# Patient Record
Sex: FEMALE
[De-identification: names, ages, dates, MRNs, and addresses within clinical notes are randomized per-mention and may not be internally consistent; named-entity substitution may affect disease eponyms.]

---

## 2021-09-03 ENCOUNTER — NON-APPOINTMENT (OUTPATIENT)
Age: 41
End: 2021-09-03

## 2021-09-07 PROBLEM — Z00.00 ENCOUNTER FOR PREVENTIVE HEALTH EXAMINATION: Status: ACTIVE | Noted: 2021-09-07

## 2021-09-13 ENCOUNTER — APPOINTMENT (OUTPATIENT)
Dept: COLORECTAL SURGERY | Facility: CLINIC | Age: 41
End: 2021-09-13
Payer: COMMERCIAL

## 2021-09-13 ENCOUNTER — TRANSCRIPTION ENCOUNTER (OUTPATIENT)
Age: 41
End: 2021-09-13

## 2021-09-13 VITALS
BODY MASS INDEX: 19.14 KG/M2 | HEART RATE: 64 BPM | SYSTOLIC BLOOD PRESSURE: 126 MMHG | WEIGHT: 104 LBS | TEMPERATURE: 98.3 F | HEIGHT: 62 IN | DIASTOLIC BLOOD PRESSURE: 81 MMHG

## 2021-09-13 PROCEDURE — 46600 DIAGNOSTIC ANOSCOPY SPX: CPT

## 2021-09-13 PROCEDURE — 99203 OFFICE O/P NEW LOW 30 MIN: CPT | Mod: 25

## 2021-09-13 NOTE — PHYSICAL EXAM
[Excoriation] : no perianal excoriation [Fistula] : a fistula [Normal] : was normal [None] : there was no rectal mass  [de-identified] : Left anterior anal fistula with seton in place [FreeTextEntry1] : Medical assistant was present for the entire exam.\par \par Anoscopy was performed for evaluation of the patients rectal bleeding  history .\par The risks, benefits and alternatives were reviewed.\par \par A lighted anoscope was passed into the anal canal and the entire anal mucosal surface was inspected..  \par The findings revealed moderate internal hemorrhoids.\par No masses or lesions were identified.\par \par

## 2021-09-13 NOTE — HISTORY OF PRESENT ILLNESS
[FreeTextEntry1] : 40 yo F presents for second opinion of anal fistula, referred by Dr. Montenegro\par s/p EUA w/ seton placement on 7/30/21 w/ Dr. Anita Dawkins at [facility]\par \par h/o anal pain April 2021 followed by yellowish anal discharge July 2021. Pt seen by [facility] Dr. Dawkins and referred for imaging which was inconclusive as per patient\par \par CT a/p 7/24/21:\par No evidence of appendicitis or diverticulitis. Leiomyomatous uterus\par \par MRI pelvis 7/27/21:\par Leiomyomatous uterus, physiologic, benign appearing adnexal follicles B/L. No abnormal pelvic mass or collection\par \par Pt was scheduled for fistulotomy, however during EUA was noted to have an anterior internal opening and left lateral opening. Fistula probe inserted and noted to connect, s/p seton placement\par As per patient, has discussed possible flap procedure w/ Dr. Dawkins. She presents today to discuss surgical options\par \par She admits slight rectal discomfort which tends to worsen during menses. Denies pain at present\par Denies fever, body aches or chills or BRBPR\par She is taking daily sitz baths, denies OTC pain medication\par BH: once daily\par Reports adequate dietary fiber intake\par Denies stool softener use or fiber supplement\par \par Never had a colonoscopy, scheduled 9/20/2021 w/ GI Dr. Ortiz\par Denies [facility] CRC or IBD\par Denies ASA/NSAIDs last 7 days

## 2021-09-20 ENCOUNTER — APPOINTMENT (OUTPATIENT)
Dept: COLORECTAL SURGERY | Facility: CLINIC | Age: 41
End: 2021-09-20
Payer: COMMERCIAL

## 2021-09-20 VITALS
TEMPERATURE: 98.3 F | WEIGHT: 104 LBS | HEART RATE: 74 BPM | BODY MASS INDEX: 19.14 KG/M2 | DIASTOLIC BLOOD PRESSURE: 74 MMHG | SYSTOLIC BLOOD PRESSURE: 125 MMHG | OXYGEN SATURATION: 100 % | HEIGHT: 62 IN

## 2021-09-20 DIAGNOSIS — K60.3 ANAL FISTULA: ICD-10-CM

## 2021-09-20 PROCEDURE — 99214 OFFICE O/P EST MOD 30 MIN: CPT

## 2021-09-20 NOTE — HISTORY OF PRESENT ILLNESS
[FreeTextEntry1] : 40yo female presents for third opinion of anal fistula\par \par h/o anal pain April 2021 followed by yellowish anal discharge July 2021. Pt seen by a provider who performed an I&D, then referred to [facility] Dr. Dawkins. Pt referred for imaging\par \par CT a/p 7/24/21:\par No evidence of appendicitis or diverticulitis. Leiomyomatous uterus\par \par MRI pelvis 7/27/21:\par Leiomyomatous uterus, physiologic, benign appearing adnexal follicles B/L. No abnormal pelvic mass or collection\par \par s/p EUA w/ seton placement on 7/30/21 w/ Dr. Anita Dawkins at [facility]\par Per , Dr Dawkins discussed anal fistula repair with anal plug and advancement flap.\par \par Seen by Dr Ash 9/13/2021 for additional opinion of management.\par \par Presents today for additional consultation and discussion of management options.\par \par Had MRI performed at [facility] on 9/15/2021 - report not available at time of visit \par \par Had coloonscopy this morning 9/20/2021 w/ GI Dr. Ortiz\par Impression:\par - Internal hemorrhoids\par - erythematous mucosa in the cecum, in the ascending colon, in the transverse colon, in the descending colon, in the sigmoid colon and in the rectum. Biopsied\par - the examined portion of the ileum was normal. Biopsied.\par \par Biopsies pending from today's colonoscopy. \par \par BH: once daily\par Denies diarrhea or constipation\par \par H/o NSVDx2 with episiotomy, children age 14 and 16\par Denies fecal urgency or incontinence to gas, liquid/solid stool

## 2021-09-20 NOTE — PHYSICAL EXAM
[FreeTextEntry1] : Medical assistant present for duration of physical examination\par  present at the request of the patient\par \par General no acute distress, alert and oriented\par Psych calm, pleasant demeanor, responding appropriately to questions\par Nonlabored breathing\par Ambulating without assistance\par Skin normal color and pigment, no visible lesions or rashes\par \par Anorectal Exam:\par Inspection no erythema, induration or fluctuance, no skin excoriation, no fissure, minimal external hemorrhoids nonthrombosed, left anterior quadrant external opening about 2cm from anal verge containing a vessel loop serving as a draining seton\par ANN nontender, no masses palpated, no blood on gloved finger\par \par Procedure: Anoscopy\par \par Pre procedure Diagnosis: anal fistula\par Post procedure Diagnosis: anal fistula\par Anesthesia: none\par Estimated blood loss: none\par Specimen: none\par Complications: none\par \par Consent obtained. Anoscopy was performed by passing a lighted anoscope with lubricant jelly into the anal canal and the entire anal mucosal surface was inspected. Findings included no fissure, noninflamed internal hemorrhoids, anterior midline internal opening in distal anal canal/at anal verge containing seton\par \par Patient tolerated examination and procedure well.\par \par \par

## 2022-11-18 NOTE — ASSESSMENT
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  I have discussed with the patient that given the anterior location and the potential sphincter involvement I have recommended they consider options for surgery including advancement flap repair versus LIF T procedure.  The risks, benefits and alternatives of surgery were outlined discussed including but not limited to risk of recurrent abscess/fistula, bleeding, infection and permanent leakage/seepage and incontinence.\par \par I have recommended they seek consultation with Dr. Lopez for consideration of surgery.  All questions answered.
18-Nov-2022